# Patient Record
(demographics unavailable — no encounter records)

---

## 2025-01-07 NOTE — HISTORY OF PRESENT ILLNESS
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    Prediabetic-Aic was 6.5 2024  trying hard to watching diet 106-110 last 1 months fBS  -pt deferred medications - watching diet and exercising  - doing portion control - cut down meat and sweets  -started watching diet , exercising 30 minutes treadmill , 15 min weights , 4 x a week   h/o High chol- started atorvastatin 10 qhs 2021 Lipids nl 3/2021 stopped medications due to ringing ear - compliant with crestor 10 qd - 2022 watching diet and lost weight 10 lbs came in to get LFt and Lipids   food allergies saw immunologist did testing -  - dust mite cat pollen , cherry apple etc allergy   Mom  of leukemia at age 59

## 2025-01-07 NOTE — HISTORY OF PRESENT ILLNESS
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    Prediabetic-Aic was 6.5 2024  trying hard to watching diet 106-110 last 1 months fBS  -pt deferred medications - watching diet and exercising  - doing portion control - cut down meat and sweets  -started watching diet , exercising 30 minutes treadmill , 15 min weights , 4 x a week   h/o High chol- started atorvastatin 10 qhs 2021 Lipids nl 3/2021 stopped medications due to ringing ear - compliant with crestor 10 qd - 2022 watching diet and lost weight 10 lbs came in to get LFt and Lipids   food allergies saw immunologist did testing -  - dust mite cat pollen , cherry apple etc allergy   Mom  of leukemia at age 59

## 2025-01-07 NOTE — ASSESSMENT
[FreeTextEntry1] : prediabetic - aic 6.6 4/2023 --> 6.5 10/2023 and 7/2024  - neg ade alb -ophtho 6/2023 ok no retinopathy -pending advised  -- eat low carbohyderate diet 1800 kcal ADA diet, cut rice, pasta, sugar, sweet, soda, juices, exercise daily 30 minutes, loose weight. f/u in 3 months.  Dyslipidemia- LDL 62 4/2023 --> 132 7/2024 -restarted crestor- 7/9/24 watching diet and lost weight  - advised low fat diet , exercise moderate intensity 120 min x week -include fruits and veg in diet  Vitamin d defeciency - start vitamin D  2000 units OTC daily .  Food allergies- eczema and food/ seasonal allergies - to immunologist  Scalp psoriasis saw derm gave clobetasone solution better now   HCM Tdap- 10/2018 mammo- 4/6/24  bi rad 2 dense breast PAP- 7/2024  negative Flu vaccine 10/2024  STD- negative 12/2017. Colonoscope - maternal aunt co ca in 50 's - 5/10/24 - 5 yrs due  pfizer first dose 3/14 /21, 4/4/21. Booster - 12/2021

## 2025-04-15 NOTE — PHYSICAL EXAM
March 5, 2018      Mike Lawton  [address] [place] [address] [address]  [place] [address]              Dear Mike,    Our records indicate that we have attempted to reach you by both phone and letter to help you to schedule your annual physical.     We recently received a call from your pharmacy requesting a refill of your medication  triamcinolone (KENALOG) 0.1 % cream. We had provided you with a greg refill and had sent you a letter notifying you of the greg refill. Unfortunately, we have not heard from you and are not able to fulfil another refill for the triamcinolone (KENALOG) 0.1 % cream    If you would like to continue your care with Dr. Muñoz, please call the clinic to schedule your appointment.    Thank you for taking an active role in your healthcare.      Sincerely,     [facility], [place]       [Normal] : soft, non-tender, non-distended, no masses palpated, no HSM and normal bowel sounds

## 2025-04-15 NOTE — HISTORY OF PRESENT ILLNESS
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

## 2025-04-15 NOTE — HISTORY OF PRESENT ILLNESS
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

## 2025-04-15 NOTE — ASSESSMENT
[FreeTextEntry1] : DM-2 - aic 6.6 4/2023 --> 6.5 10/2023 and 7/2024--> 6.7 1/2025 --> poc AIC 6.5 improving 4/15/25   - neg ade alb on metformin 500 QD  -ophtho 6/2023 ok no retinopathy -pending advised -referral placed  -- eat low carbohydrate diet 1800 kcal ADA diet, cut rice, pasta, sugar, sweet, soda, juices, exercise daily 30 minutes, loose weight. f/u in 3 months.  seasonal allergies - rx Flonase send   Dyslipidemia- LDL 62 4/2023 --> 132 7/2024--> 67 1/2025  -restarted crestor 10 - 7/9/24 watching diet and lost weight  - advised low fat diet , exercise moderate intensity 120 min x week -include fruits and veg in diet  Vitamin d deficiency - start vitamin D 2000 units OTC daily .  Food allergies- eczema and food/ seasonal allergies - to immunologist  Scalp psoriasis saw derm gave clobetasone solution better now   HCM Tdap- 10/2018 Mammo- 4/6/24  bi rad 2 dense breast-has appt 5/3/25  PAP- 7/2024  negative Flu vaccine 10/2024  STD- negative 12/2017. Colonoscope - maternal aunt co ca in 50 's - 5/10/24 - 5 yrs due  pfizer first dose 3/14 /21, 4/4/21. Booster - 12/2021

## 2025-07-22 NOTE — HEALTH RISK ASSESSMENT
[Good] : ~his/her~  mood as  good [No] : In the past 12 months have you used drugs other than those required for medical reasons? No [No falls in past year] : Patient reported no falls in the past year [0] : 2) Feeling down, depressed, or hopeless: Not at all (0) [PHQ-2 Negative - No further assessment needed] : PHQ-2 Negative - No further assessment needed [Never] : Never [With Significant Other] : lives with significant other [Employed] : employed [] :  [de-identified] : [employer]  [[id number]] : 0 [Reports changes in hearing] : Reports no changes in hearing [Reports changes in vision] : Reports no changes in vision [Reports changes in dental health] : Reports no changes in dental health [FreeTextEntry2] :   @ [place] [facility]  [de-identified] : 6/30/;25

## 2025-07-22 NOTE — ASSESSMENT
[Vaccines Reviewed] : Immunizations reviewed today. Please see immunization details in the vaccine log within the immunization flowsheet.  [FreeTextEntry1] : DM-2 - aic 6.6 4/2023 --> 6.5 10/2023 and 7/2024--> 6.7 1/2025 --> poc AIC 6.5 improving 4/15/25  -followed by PATH program  - neg ade alb on metformin 500 QD  -ophtho 6/2025 ok no retinopathy  -- eat low carbohydrate diet 1800 kcal ADA diet, cut rice, pasta, sugar, sweet, soda, juices, exercise daily 30 minutes, loose weight. f/u in 3 months.  seasonal allergies - rx Flonase send   Dyslipidemia- LDL 62 4/2023 --> 132 7/2024--> 67 1/2025  -restarted crestor 10 - 7/9/24 watching diet and lost weight  - advised low fat diet , exercise moderate intensity 120 min x week -include fruits and veg in diet  Vitamin d deficiency - start vitamin D 2000 units OTC daily .  Food allergies- eczema and food/ seasonal allergies - to immunologist  Scalp psoriasis saw derm gave clobetasone solution better now   HCM Tdap- 10/2018 Mammo- 5/3/25 bi rad 2 dense breast PAP- 7/2024 negative Flu vaccine 10/2024  STD- negative 12/2017. Colonoscope - maternal aunt co ca in 50 's - 5/10/24 - 5 yrs 2029 due pfizer first dose 3/14 /21, 4/4/21. Booster - 12/2021  pt will do fasting BW

## 2025-07-22 NOTE — HEALTH RISK ASSESSMENT
[Good] : ~his/her~  mood as  good [No] : In the past 12 months have you used drugs other than those required for medical reasons? No [No falls in past year] : Patient reported no falls in the past year [0] : 2) Feeling down, depressed, or hopeless: Not at all (0) [PHQ-2 Negative - No further assessment needed] : PHQ-2 Negative - No further assessment needed [Never] : Never [With Significant Other] : lives with significant other [Employed] : employed [] :  [de-identified] : [employer]  [[id number]] : 0 [Reports changes in hearing] : Reports no changes in hearing [Reports changes in vision] : Reports no changes in vision [Reports changes in dental health] : Reports no changes in dental health [FreeTextEntry2] :   @ [place] [facility]  [de-identified] : 6/30/;25

## 2025-07-22 NOTE — PHYSICAL EXAM
SW/CM Discharge Plan    Anticipating that the pt will discharge home over the weekend. Patient’s discharge destination is Home/apartment with Spouse/SO. Patient to be picked up by spouse.  Patient/interested person has been counseled for post hospitalization care.  Patient agrees and understands goals and plan. Initial implementation of the patient’s discharge plan has been arranged, including any devices/equipment needed for discharge. Discharge plan communicated to RN.     Met with pt this morning.  Pt doing well. Pt plans on discharging home tomorrow. Pt does not have any questions or concerns at this time. No needs for discharge. Business card for CM/SW given to the pt for future reference. aida     [No Acute Distress] : no acute distress [Well-Appearing] : well-appearing [Normal Sclera/Conjunctiva] : normal sclera/conjunctiva [PERRL] : pupils equal round and reactive to light [EOMI] : extraocular movements intact [Normal Outer Ear/Nose] : the outer ears and nose were normal in appearance [Normal Oropharynx] : the oropharynx was normal [No JVD] : no jugular venous distention [No Lymphadenopathy] : no lymphadenopathy [Supple] : supple [Thyroid Normal, No Nodules] : the thyroid was normal and there were no nodules present [No Respiratory Distress] : no respiratory distress  [No Accessory Muscle Use] : no accessory muscle use [Clear to Auscultation] : lungs were clear to auscultation bilaterally [Normal Rate] : normal rate  [Regular Rhythm] : with a regular rhythm [Normal S1, S2] : normal S1 and S2 [No Murmur] : no murmur heard [Pedal Pulses Present] : the pedal pulses are present [No Edema] : there was no peripheral edema [No Extremity Clubbing/Cyanosis] : no extremity clubbing/cyanosis [Soft] : abdomen soft [Non Tender] : non-tender [Non-distended] : non-distended [No Masses] : no abdominal mass palpated [No HSM] : no HSM [Normal Bowel Sounds] : normal bowel sounds [Normal Posterior Cervical Nodes] : no posterior cervical lymphadenopathy [Normal Anterior Cervical Nodes] : no anterior cervical lymphadenopathy [No CVA Tenderness] : no CVA  tenderness [No Spinal Tenderness] : no spinal tenderness [No Joint Swelling] : no joint swelling [Grossly Normal Strength/Tone] : grossly normal strength/tone [No Rash] : no rash [Coordination Grossly Intact] : coordination grossly intact [No Focal Deficits] : no focal deficits [Normal Gait] : normal gait [Deep Tendon Reflexes (DTR)] : deep tendon reflexes were 2+ and symmetric [Normal Affect] : the affect was normal [Normal Insight/Judgement] : insight and judgment were intact

## 2025-07-22 NOTE — HISTORY OF PRESENT ILLNESS
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    DM-Aic was 6.7 --> 6.5  signed up for PATH program  -  on metformin 500 qd   trying hard to watching diet- fasting's elevated 126  last 1 months fBS  - doing portion control - cut down meat and sweets  -started watching diet , exercising 30 minutes treadmill , 15 min weights , 4 x a week   h/o High chol- started atorvastatin 10 qhs 2021 Lipids nl 3/2021 stopped medications due to ringing ear - compliant with crestor 10 qd - 2022 watching diet and lost weight 10 lbs came in to get LFt and Lipids   food allergies saw immunologist did testing -  - dust mite cat pollen , cherry apple etc allergy   Mom  of leukemia at age 59